# Patient Record
Sex: MALE | Race: WHITE | NOT HISPANIC OR LATINO | URBAN - METROPOLITAN AREA
[De-identification: names, ages, dates, MRNs, and addresses within clinical notes are randomized per-mention and may not be internally consistent; named-entity substitution may affect disease eponyms.]

---

## 2022-06-24 ENCOUNTER — INPATIENT (INPATIENT)
Facility: HOSPITAL | Age: 57
LOS: 0 days | Discharge: HOME | DRG: 63 | End: 2022-06-25
Attending: PSYCHIATRY & NEUROLOGY | Admitting: PSYCHIATRY & NEUROLOGY
Payer: COMMERCIAL

## 2022-06-24 VITALS
DIASTOLIC BLOOD PRESSURE: 81 MMHG | SYSTOLIC BLOOD PRESSURE: 127 MMHG | OXYGEN SATURATION: 97 % | HEART RATE: 60 BPM | RESPIRATION RATE: 18 BRPM

## 2022-06-24 LAB
ALBUMIN SERPL ELPH-MCNC: 4.3 G/DL — SIGNIFICANT CHANGE UP (ref 3.4–5)
ALP SERPL-CCNC: 81 U/L — SIGNIFICANT CHANGE UP (ref 40–120)
ALT FLD-CCNC: 31 U/L — SIGNIFICANT CHANGE UP (ref 12–42)
ANION GAP SERPL CALC-SCNC: 9 MMOL/L — SIGNIFICANT CHANGE UP (ref 9–16)
APTT BLD: 30 SEC — SIGNIFICANT CHANGE UP (ref 27.5–35.5)
AST SERPL-CCNC: 25 U/L — SIGNIFICANT CHANGE UP (ref 15–37)
BASOPHILS # BLD AUTO: 0.05 K/UL — SIGNIFICANT CHANGE UP (ref 0–0.2)
BASOPHILS NFR BLD AUTO: 0.4 % — SIGNIFICANT CHANGE UP (ref 0–2)
BILIRUB SERPL-MCNC: 0.5 MG/DL — SIGNIFICANT CHANGE UP (ref 0.2–1.2)
BUN SERPL-MCNC: 20 MG/DL — SIGNIFICANT CHANGE UP (ref 7–23)
CALCIUM SERPL-MCNC: 9 MG/DL — SIGNIFICANT CHANGE UP (ref 8.5–10.5)
CHLORIDE SERPL-SCNC: 102 MMOL/L — SIGNIFICANT CHANGE UP (ref 96–108)
CO2 SERPL-SCNC: 26 MMOL/L — SIGNIFICANT CHANGE UP (ref 22–31)
CREAT SERPL-MCNC: 0.98 MG/DL — SIGNIFICANT CHANGE UP (ref 0.5–1.3)
EGFR: 89 ML/MIN/1.73M2 — SIGNIFICANT CHANGE UP
EOSINOPHIL # BLD AUTO: 0.03 K/UL — SIGNIFICANT CHANGE UP (ref 0–0.5)
EOSINOPHIL NFR BLD AUTO: 0.2 % — SIGNIFICANT CHANGE UP (ref 0–6)
GLUCOSE SERPL-MCNC: 149 MG/DL — HIGH (ref 70–99)
HCT VFR BLD CALC: 41.7 % — SIGNIFICANT CHANGE UP (ref 39–50)
HGB BLD-MCNC: 14.3 G/DL — SIGNIFICANT CHANGE UP (ref 13–17)
IMM GRANULOCYTES NFR BLD AUTO: 0.6 % — SIGNIFICANT CHANGE UP (ref 0–1.5)
INR BLD: 1.01 — SIGNIFICANT CHANGE UP (ref 0.88–1.16)
LYMPHOCYTES # BLD AUTO: 0.9 K/UL — LOW (ref 1–3.3)
LYMPHOCYTES # BLD AUTO: 7.2 % — LOW (ref 13–44)
MAGNESIUM SERPL-MCNC: 2.1 MG/DL — SIGNIFICANT CHANGE UP (ref 1.6–2.6)
MCHC RBC-ENTMCNC: 31 PG — SIGNIFICANT CHANGE UP (ref 27–34)
MCHC RBC-ENTMCNC: 34.3 GM/DL — SIGNIFICANT CHANGE UP (ref 32–36)
MCV RBC AUTO: 90.5 FL — SIGNIFICANT CHANGE UP (ref 80–100)
MONOCYTES # BLD AUTO: 0.45 K/UL — SIGNIFICANT CHANGE UP (ref 0–0.9)
MONOCYTES NFR BLD AUTO: 3.6 % — SIGNIFICANT CHANGE UP (ref 2–14)
NEUTROPHILS # BLD AUTO: 11.07 K/UL — HIGH (ref 1.8–7.4)
NEUTROPHILS NFR BLD AUTO: 88 % — HIGH (ref 43–77)
NRBC # BLD: 0 /100 WBCS — SIGNIFICANT CHANGE UP (ref 0–0)
NT-PROBNP SERPL-SCNC: 22 PG/ML — SIGNIFICANT CHANGE UP
PLATELET # BLD AUTO: 247 K/UL — SIGNIFICANT CHANGE UP (ref 150–400)
POTASSIUM SERPL-MCNC: 3.9 MMOL/L — SIGNIFICANT CHANGE UP (ref 3.5–5.3)
POTASSIUM SERPL-SCNC: 3.9 MMOL/L — SIGNIFICANT CHANGE UP (ref 3.5–5.3)
PROT SERPL-MCNC: 7.6 G/DL — SIGNIFICANT CHANGE UP (ref 6.4–8.2)
PROTHROM AB SERPL-ACNC: 11.8 SEC — SIGNIFICANT CHANGE UP (ref 10.5–13.4)
RBC # BLD: 4.61 M/UL — SIGNIFICANT CHANGE UP (ref 4.2–5.8)
RBC # FLD: 13.2 % — SIGNIFICANT CHANGE UP (ref 10.3–14.5)
SARS-COV-2 RNA SPEC QL NAA+PROBE: SIGNIFICANT CHANGE UP
SODIUM SERPL-SCNC: 137 MMOL/L — SIGNIFICANT CHANGE UP (ref 132–145)
TROPONIN I, HIGH SENSITIVITY RESULT: <4 NG/L — SIGNIFICANT CHANGE UP
WBC # BLD: 12.57 K/UL — HIGH (ref 3.8–10.5)
WBC # FLD AUTO: 12.57 K/UL — HIGH (ref 3.8–10.5)

## 2022-06-24 PROCEDURE — 0042T: CPT

## 2022-06-24 PROCEDURE — 70496 CT ANGIOGRAPHY HEAD: CPT | Mod: 26

## 2022-06-24 PROCEDURE — 70498 CT ANGIOGRAPHY NECK: CPT | Mod: 26

## 2022-06-24 PROCEDURE — 99291 CRITICAL CARE FIRST HOUR: CPT

## 2022-06-24 RX ORDER — ALTEPLASE 100 MG
8.3 KIT INTRAVENOUS ONCE
Refills: 0 | Status: COMPLETED | OUTPATIENT
Start: 2022-06-24 | End: 2022-06-24

## 2022-06-24 RX ORDER — MECLIZINE HCL 12.5 MG
25 TABLET ORAL ONCE
Refills: 0 | Status: COMPLETED | OUTPATIENT
Start: 2022-06-24 | End: 2022-06-24

## 2022-06-24 RX ORDER — SODIUM CHLORIDE 9 MG/ML
50 INJECTION INTRAMUSCULAR; INTRAVENOUS; SUBCUTANEOUS
Refills: 0 | Status: DISCONTINUED | OUTPATIENT
Start: 2022-06-24 | End: 2022-06-25

## 2022-06-24 RX ORDER — ATORVASTATIN CALCIUM 80 MG/1
80 TABLET, FILM COATED ORAL AT BEDTIME
Refills: 0 | Status: DISCONTINUED | OUTPATIENT
Start: 2022-06-25 | End: 2022-06-25

## 2022-06-24 RX ORDER — ONDANSETRON 8 MG/1
4 TABLET, FILM COATED ORAL ONCE
Refills: 0 | Status: COMPLETED | OUTPATIENT
Start: 2022-06-24 | End: 2022-06-24

## 2022-06-24 RX ORDER — SODIUM CHLORIDE 9 MG/ML
50 INJECTION INTRAMUSCULAR; INTRAVENOUS; SUBCUTANEOUS
Refills: 0 | Status: DISCONTINUED | OUTPATIENT
Start: 2022-06-24 | End: 2022-06-24

## 2022-06-24 RX ORDER — ALTEPLASE 100 MG
74.5 KIT INTRAVENOUS ONCE
Refills: 0 | Status: COMPLETED | OUTPATIENT
Start: 2022-06-24 | End: 2022-06-24

## 2022-06-24 RX ADMIN — Medication 25 MILLIGRAM(S): at 14:30

## 2022-06-24 RX ADMIN — ALTEPLASE 74.5 MILLIGRAM(S): KIT at 15:00

## 2022-06-24 RX ADMIN — SODIUM CHLORIDE 74.5 MILLILITER(S): 9 INJECTION INTRAMUSCULAR; INTRAVENOUS; SUBCUTANEOUS at 16:00

## 2022-06-24 RX ADMIN — ALTEPLASE 498 MILLIGRAM(S): KIT at 14:53

## 2022-06-24 RX ADMIN — ALTEPLASE 8.3 MILLIGRAM(S): KIT at 14:54

## 2022-06-24 RX ADMIN — ONDANSETRON 4 MILLIGRAM(S): 8 TABLET, FILM COATED ORAL at 14:35

## 2022-06-24 RX ADMIN — ONDANSETRON 4 MILLIGRAM(S): 8 TABLET, FILM COATED ORAL at 14:05

## 2022-06-24 NOTE — ED ADULT TRIAGE NOTE - CHIEF COMPLAINT QUOTE
pt walk in c/o dizziness , diaphoresis and nausea x 2 hours after traveling from [place]. also c/o feeling off balance .denies med hx. unable to get oral temp

## 2022-06-24 NOTE — ED ADULT NURSE NOTE - CHIEF COMPLAINT QUOTE
pt walk in c/o dizziness , diaphoresis and nausea x 2 hours after traveling from [place]. also c/o feeling off balance .denies med hx. unable to get oral temp

## 2022-06-24 NOTE — ED PROVIDER NOTE - PROGRESS NOTE DETAILS
Stroke code initiated through CTC. Awaiting neuro consult. Escalated through CTC. Attempted to contact Dr. Dick. Contacted Dr. Dick and given story. Advised TPA. Will contact radiology to obtain CTA reports. Will TPA to post CTA results to exclude dissection. Patient transported. Already given TPA. No change in clinical status. CTA with perfusion mismatch. Patient admitted to NICU under neurology service. Contacted Dr. Dick and given story. Advised TPA via phone consult; telestroke not initiated. Will contact radiology to obtain CTA reports. Will TPA to post CTA results to exclude dissection.

## 2022-06-24 NOTE — ED PROVIDER NOTE - UNABLE TO OBTAIN
Urgent need for Intervention History and ROS limited 2/2 inability to cooperate with remainder of history due to clinical condition.

## 2022-06-24 NOTE — H&P ADULT - NSHPLABSRESULTS_GEN_ALL_CORE
LABS:                         14.3   12.57 )-----------( 247      ( 24 Jun 2022 14:04 )             41.7     06-24    137  |  102  |  20  ----------------------------<  149<H>  3.9   |  26  |  0.98    Ca    9.0      24 Jun 2022 14:04  Mg     2.1     06-24    TPro  7.6  /  Alb  4.3  /  TBili  0.5  /  DBili  x   /  AST  25  /  ALT  31  /  AlkPhos  81  06-24    PT/INR - ( 24 Jun 2022 14:04 )   PT: 11.8 sec;   INR: 1.01          PTT - ( 24 Jun 2022 14:04 )  PTT:30.0 sec        Serum Pro-Brain Natriuretic Peptide: 22 pg/mL (06-24 @ 14:05)        RADIOLOGY, EKG & ADDITIONAL TESTS:

## 2022-06-24 NOTE — H&P ADULT - NSHPREVIEWOFSYSTEMS_GEN_ALL_CORE
ROS:  Constitutional: No fever, weight loss or fatigue  Eyes: No eye pain, visual disturbances, or discharge  ENMT:  No difficulty hearing, tinnitus, vertigo;  No sinus or throat pain  Neck: No pain or stiffness  Respiratory: No cough, wheezing, chills or hemoptysis  Cardiovascular: No chest pain, palpitations, shortness of breath, dizziness or leg swelling  Gastrointestinal: No abdominal pain. No nausea, vomiting or hematemesis; No diarrhea or constipation. Nohematochezia.  Genitourinary: No dysuria, frequency, hematuria or incontinence  Neurological: As per HPI  Endocrine: No heat or cold intolerance; No hair loss  Musculoskeletal: No joint pain or swelling; No muscle, back or extremity pain

## 2022-06-24 NOTE — H&P ADULT - NSHPPHYSICALEXAM_GEN_ALL_CORE
General: No acute distress, awake and alert  Cardiovascular: Regular rate and rhythm on the monitor.  Pulmonary: Anterior breath sounds clear bilaterally, No use of accessory muscles.  Extremities: Radial and DP pulses +2, no edema  Neurologic:  -Mental status: Awake, alert, oriented to person, place, and time. Speech is fluent with intact naming, repetition, and comprehension, no dysarthria. Recent and remote memory intact. Follows commands. Attention/concentration intact. Fund of knowledge appropriate.  -Cranial nerves:   II: Visual fields are full to confrontation.  III, IV, VI: Extraocular movements are intact with R end beating nystagmus when looking to extreme Right. Pupils equally round and reactive to light  V:  Facial sensation V1-V3 equal and intact   VII: Face is symmetric with normal eye closure and smile  VIII: Hearing is bilaterally intact to finger rub  IX, X: Uvula is midline and soft palate rises symmetrically  XI: Head turning and shoulder shrug are intact.  XII: Tongue protrudes midline  Motor: Normal bulk and tone. No pronator drift. Strength bilateral upper extremity 5/5, bilateral lower extremities 5/5.  Rapid alternating movements intact and symmetric  Sensation: Intact to light touch bilaterally. No neglect or extinction on double simultaneous testing.  Coordination: No dysmetria on finger-to-nose and heel-to-shin bilaterally  Reflexes: Downgoing toes bilaterally   Gait: Deferred    NIHSS: 0

## 2022-06-24 NOTE — CONSULT NOTE ADULT - ASSESSMENT
Assessment and Plan :    55YO R handed male with no PMH, not on any home meds, presented to [facility] ER for intense and persistent dizziness which he describes as "room spinning feeling" that started when he got out of the airport and on his way to the hotel in the cab associated with N/V/Sweating and noted to be unsteady when getting out of the cab. Denies any HA/CP/Palp/vision or hearing Changes. Initial imaging negative for any acute intracranial pathology, now S/P tPA, transferred to [facility] MICU for further close neuro monitoring and work up.      1) Admit to MICU for post alteplase monitoring  - once alteplase drip completed, please flush IV line with 50cc NS 0.9%  - Please perform dysphagia screen now   - Once dysphagia screen passed, start atorvastatin 80 once daily  - Notify provider if patient passes dysphagia screen able to have diet if no pending intervention  - Keep BP <180/105, notify provider if out of range  - During infusion, neuro exams every 15 minutes.   - Check every 15 minutes for first hour after infusion is stopped; every 30 minutes for the next 6 hours; hourly until 24 hours from end of infusion.   - Minimize arterial and venous punctures, able to draw blood 4hr s/p alteplase  - Keep temp <100F and maintain glucose 140-180.   - Notify provider for "HR >100 or <55 or SaO2 <95%"  - Maintain strict bed rest for 12 hours with HOB <30 degrees  - After 12hr s/p alteplase, patient able to ambulate with assist  - Repeat CT head with any acute change in mental status.   - No antiplatelet, anticoagulation, and heparin sq until 24 hour CT head negative for bleed.     2) Labs: Obtain Hemoglobin A1c, Lipid profile set, and TSH    3) Other tests:  - Repeat CT head noncon 24 hours post-alteplase to rule out bleed  - Obtain MRI brain without contrast   - echo with bubble, may eventually need JEANNE  - PT/OT order  - Swallow evaluation if fails dysphagia screen  - SLP order if patient with dysarthria  - Stroke education    4)DVT ppx - SCDs (NO heparin SQ as post alteplase protocol)    Discussed with Neurology Attending Dr. Dick

## 2022-06-24 NOTE — PATIENT PROFILE ADULT - DO YOU LACK THE NECESSARY SUPPORT TO HELP YOU COPE WITH LIFE CHALLENGES?
Request for: Focalin XR 25 mg cap, take 1 cap daily  Last prescribed: 3/7/18   #30   Refills: 0    Next: 6/15/18  Dr. Yahr Nelson  No show(s)/pt cancel: 0  Last:  12/5/17    Verified dose against providers last note.    Per PDMP last filled 3/7/18 #30  PDMP review: Criteria met. Forwarded to Physician/DECLAN for approval/signature. Requests 2 months of scripts to cover until their appt with new provider.           no

## 2022-06-24 NOTE — H&P ADULT - ASSESSMENT
55YO R handed male with no PMH, not on any home meds, presented to [facility] ER for intense and persistent dizziness which he describes as "room spinning feeling" that started when he got out of the airport and on his way to the hotel in the cab associated with N/V/Sweating and noted to be unsteady when getting out of the cab. Denies any HA/CP/Palp/vision or hearing Changes. Initial imaging negative for any acute intracranial pathology, now S/P tPA, transferred to [facility] MICU for further close neuro monitoring and work up.    1) Admit to MICU for post alteplase monitoring  - once alteplase drip completed, please flush IV line with 50cc NS 0.9%  - Please perform dysphagia screen now   - Once dysphagia screen passed, start atorvastatin 80 once daily  - Notify provider if patient passes dysphagia screen able to have diet if no pending intervention  - Keep BP <180/105, notify provider if out of range  - During infusion, neuro exams every 15 minutes.   - Check every 15 minutes for first hour after infusion is stopped; every 30 minutes for the next 6 hours; hourly until 24 hours from end of infusion.   - Minimize arterial and venous punctures, able to draw blood 4hr s/p alteplase  - Keep temp <100F and maintain glucose 140-180.   - Notify provider for "HR >100 or <55 or SaO2 <95%"  - Maintain strict bed rest for 12 hours with HOB <30 degrees  - After 12hr s/p alteplase, patient able to ambulate with assist  - Repeat CT head with any acute change in mental status.   - No antiplatelet, anticoagulation, and heparin sq until 24 hour CT head negative for bleed.     2) Labs: Obtain Hemoglobin A1c, Lipid profile set, and TSH    3) Other tests:  - Repeat CT head noncon 24 hours post-alteplase to rule out bleed  - Obtain MRI brain without contrast   - echo with bubble, may eventually need JEANNE  - PT/OT order  - Swallow evaluation if fails dysphagia screen  - SLP order if patient with dysarthria  - Stroke education    4)DVT ppx - SCDs (NO heparin SQ as post alteplase protocol)    Discussed with Neurology Attending Dr. Dick

## 2022-06-24 NOTE — ED PROVIDER NOTE - CRITICAL CARE ATTENDING CONTRIBUTION TO CARE
The patient was seen immediately upon arrival due to a high probability of imminent or life-threatening deterioration secondary to possible acute stroke, which required my direct attention, intervention, and personal management at the bedside. I have personally provided critical care time exclusive of time spent on separately billable procedures. Time includes review of laboratory data, radiology results, discussion with consultants, discussion with the patient's family, and monitoring for potential decompensation.    56 M presenting with acute onset vertigo at 11:45 am. Sx most consistent with peripheral but are severe. Stroke code initiated. VSS + vertigo, + horizontal rotary nystagmus with R gaze. FTN/Heel shin wnl. CT head wnl. CTA neg. Perfusion study showed some mismatch. No improvement with Meclizine abd Epley maneuver. TPA administered incase it was a CVA. Admitted to ICU at [facility].

## 2022-06-24 NOTE — ED ADULT NURSE NOTE - OBJECTIVE STATEMENT
BIBwife through walk in triage c/o sudden onset of dizziness described as unsteady, nausea and vomiting with diaphoresis at 11:45am. wife became concerned, pt never had this before, took a taxi to the nearest ER since they are visiting from [place]. STROKE code activated 2/2 clinical upgrade. NIHS: 2 due to ataxia (unable to ambulate 2/2 dizziness). pt placed on telemetry, FS completed. will continue to monitor. denies CP, SOB, HA. no PMHX. SEE STROKE CODE WORKFLOW FOR FURTHER DETAILS

## 2022-06-24 NOTE — ED ADULT NURSE REASSESSMENT NOTE - NS ED NURSE REASSESS COMMENT FT1
given 4mg zofran IVP due to N/V, verbal order JESSENIA Wang
pt left on EMS stretcher with TPA infusion. pt dose verified with pharmacy and paramedic. reports N/V is better s/p antiemetics. awaiting nurse hand off
given 25mg meclizine PO and 4mg zofran IVP. +passed dysphagia screen

## 2022-06-24 NOTE — ED PROVIDER NOTE - OBJECTIVE STATEMENT
55 y/o male with no pertinent PMHx who was travelling to [facility] from [place] today. Patient got off the plane and into a cab and was on his way to his hotel when he developed intense and intractable dizziness. Patient also had difficulty standing when arriving to the hotel and needed help walking into the hotel. Could not bear weight. Symptoms started at 11:45am. Patient's wife decided to bring him to the ED when the Patient stated the dizziness would not resolve. Patient required help going downstairs to the hotel lobby and into the vehicle to come to the ED. Patient arrived to the ED extremely nauseous and vomiting; eyes closed. Unable to stand. Stroke code initiated.

## 2022-06-24 NOTE — H&P ADULT - HISTORY OF PRESENT ILLNESS
Chief Complaint: Dizzy, unsteady gait   Last known well time/Time of onset of symptoms: 06/24 @ 11:30am    HPI: 56YOM with no PMH, not on any home meds was travelling from [place] to [place] today. Per pt, started feeling dizzy on his way to hot from airport, while in the cab, associated with N/V/ unsteady gait and sweating. Pt noted to be very unsteady when attempting to get out of the cab and needed assistance. Denies any fall. Denies any HA/CP/SOB/palpitations/vision or hearing changes. States has never experienced this before. Describes dizziness as "room spinning feeling" which is continuous, though less intense now, worse with position change and noted to be resting with his eyes closed to help with dizziness. Pt presented to [facility] ER , stroke code was activated with intial CTH negative for any acute intracranial pathology, CTA H/N with no LVO/ stenosis and CTP with possible prolonged Tmax in R anterior temporal lobe and R cerebellar hemisphere which does not follow expected vascular territory. Pt received tPA @ [facility] ER and was transferred to [facility] MICU for close Neuro monitoring and further work up.

## 2022-06-25 VITALS — HEART RATE: 75 BPM | RESPIRATION RATE: 34 BRPM | OXYGEN SATURATION: 97 %

## 2022-06-25 LAB
A1C WITH ESTIMATED AVERAGE GLUCOSE RESULT: 5.3 % — SIGNIFICANT CHANGE UP (ref 4–5.6)
ANION GAP SERPL CALC-SCNC: 9 MMOL/L — SIGNIFICANT CHANGE UP (ref 5–17)
BUN SERPL-MCNC: 13 MG/DL — SIGNIFICANT CHANGE UP (ref 7–23)
CALCIUM SERPL-MCNC: 8.8 MG/DL — SIGNIFICANT CHANGE UP (ref 8.4–10.5)
CHLORIDE SERPL-SCNC: 104 MMOL/L — SIGNIFICANT CHANGE UP (ref 96–108)
CHOLEST SERPL-MCNC: 217 MG/DL — HIGH
CO2 SERPL-SCNC: 26 MMOL/L — SIGNIFICANT CHANGE UP (ref 22–31)
CREAT SERPL-MCNC: 0.94 MG/DL — SIGNIFICANT CHANGE UP (ref 0.5–1.3)
EGFR: 94 ML/MIN/1.73M2 — SIGNIFICANT CHANGE UP
ESTIMATED AVERAGE GLUCOSE: 105 MG/DL — SIGNIFICANT CHANGE UP (ref 68–114)
GLUCOSE SERPL-MCNC: 113 MG/DL — HIGH (ref 70–99)
HCT VFR BLD CALC: 39.2 % — SIGNIFICANT CHANGE UP (ref 39–50)
HCV AB S/CO SERPL IA: 0.04 S/CO — SIGNIFICANT CHANGE UP
HCV AB SERPL-IMP: SIGNIFICANT CHANGE UP
HDLC SERPL-MCNC: 57 MG/DL — SIGNIFICANT CHANGE UP
HGB BLD-MCNC: 13.6 G/DL — SIGNIFICANT CHANGE UP (ref 13–17)
LIPID PNL WITH DIRECT LDL SERPL: 140 MG/DL — HIGH
MAGNESIUM SERPL-MCNC: 2 MG/DL — SIGNIFICANT CHANGE UP (ref 1.6–2.6)
MCHC RBC-ENTMCNC: 30.6 PG — SIGNIFICANT CHANGE UP (ref 27–34)
MCHC RBC-ENTMCNC: 34.7 GM/DL — SIGNIFICANT CHANGE UP (ref 32–36)
MCV RBC AUTO: 88.1 FL — SIGNIFICANT CHANGE UP (ref 80–100)
NON HDL CHOLESTEROL: 160 MG/DL — HIGH
NRBC # BLD: 0 /100 WBCS — SIGNIFICANT CHANGE UP (ref 0–0)
PHOSPHATE SERPL-MCNC: 3 MG/DL — SIGNIFICANT CHANGE UP (ref 2.5–4.5)
PLATELET # BLD AUTO: 235 K/UL — SIGNIFICANT CHANGE UP (ref 150–400)
POTASSIUM SERPL-MCNC: 3.9 MMOL/L — SIGNIFICANT CHANGE UP (ref 3.5–5.3)
POTASSIUM SERPL-SCNC: 3.9 MMOL/L — SIGNIFICANT CHANGE UP (ref 3.5–5.3)
RBC # BLD: 4.45 M/UL — SIGNIFICANT CHANGE UP (ref 4.2–5.8)
RBC # FLD: 13.2 % — SIGNIFICANT CHANGE UP (ref 10.3–14.5)
SODIUM SERPL-SCNC: 139 MMOL/L — SIGNIFICANT CHANGE UP (ref 135–145)
TRIGL SERPL-MCNC: 100 MG/DL — SIGNIFICANT CHANGE UP
TSH SERPL-MCNC: 0.46 UIU/ML — SIGNIFICANT CHANGE UP (ref 0.27–4.2)
WBC # BLD: 8.12 K/UL — SIGNIFICANT CHANGE UP (ref 3.8–10.5)
WBC # FLD AUTO: 8.12 K/UL — SIGNIFICANT CHANGE UP (ref 3.8–10.5)

## 2022-06-25 PROCEDURE — 83036 HEMOGLOBIN GLYCOSYLATED A1C: CPT

## 2022-06-25 PROCEDURE — 93306 TTE W/DOPPLER COMPLETE: CPT

## 2022-06-25 PROCEDURE — 84484 ASSAY OF TROPONIN QUANT: CPT

## 2022-06-25 PROCEDURE — 84100 ASSAY OF PHOSPHORUS: CPT

## 2022-06-25 PROCEDURE — 85025 COMPLETE CBC W/AUTO DIFF WBC: CPT

## 2022-06-25 PROCEDURE — 83880 ASSAY OF NATRIURETIC PEPTIDE: CPT

## 2022-06-25 PROCEDURE — 80061 LIPID PANEL: CPT

## 2022-06-25 PROCEDURE — 80053 COMPREHEN METABOLIC PANEL: CPT

## 2022-06-25 PROCEDURE — 0042T: CPT

## 2022-06-25 PROCEDURE — 99291 CRITICAL CARE FIRST HOUR: CPT | Mod: 25

## 2022-06-25 PROCEDURE — 70551 MRI BRAIN STEM W/O DYE: CPT

## 2022-06-25 PROCEDURE — 70498 CT ANGIOGRAPHY NECK: CPT

## 2022-06-25 PROCEDURE — 84443 ASSAY THYROID STIM HORMONE: CPT

## 2022-06-25 PROCEDURE — 93306 TTE W/DOPPLER COMPLETE: CPT | Mod: 26

## 2022-06-25 PROCEDURE — 70450 CT HEAD/BRAIN W/O DYE: CPT

## 2022-06-25 PROCEDURE — 70496 CT ANGIOGRAPHY HEAD: CPT

## 2022-06-25 PROCEDURE — 82962 GLUCOSE BLOOD TEST: CPT

## 2022-06-25 PROCEDURE — 37195 THROMBOLYTIC THERAPY STROKE: CPT

## 2022-06-25 PROCEDURE — 85027 COMPLETE CBC AUTOMATED: CPT

## 2022-06-25 PROCEDURE — 85610 PROTHROMBIN TIME: CPT

## 2022-06-25 PROCEDURE — 80048 BASIC METABOLIC PNL TOTAL CA: CPT

## 2022-06-25 PROCEDURE — 96374 THER/PROPH/DIAG INJ IV PUSH: CPT | Mod: XU

## 2022-06-25 PROCEDURE — 85730 THROMBOPLASTIN TIME PARTIAL: CPT

## 2022-06-25 PROCEDURE — 83735 ASSAY OF MAGNESIUM: CPT

## 2022-06-25 PROCEDURE — 36415 COLL VENOUS BLD VENIPUNCTURE: CPT

## 2022-06-25 PROCEDURE — 86803 HEPATITIS C AB TEST: CPT

## 2022-06-25 PROCEDURE — 70551 MRI BRAIN STEM W/O DYE: CPT | Mod: 26

## 2022-06-25 PROCEDURE — 87635 SARS-COV-2 COVID-19 AMP PRB: CPT

## 2022-06-25 RX ORDER — ATORVASTATIN CALCIUM 80 MG/1
1 TABLET, FILM COATED ORAL
Qty: 30 | Refills: 0
Start: 2022-06-25 | End: 2022-07-24

## 2022-06-25 RX ORDER — ASPIRIN/CALCIUM CARB/MAGNESIUM 324 MG
1 TABLET ORAL
Qty: 30 | Refills: 0
Start: 2022-06-25 | End: 2022-07-24

## 2022-06-25 RX ORDER — CLOPIDOGREL BISULFATE 75 MG/1
1 TABLET, FILM COATED ORAL
Qty: 30 | Refills: 0
Start: 2022-06-25 | End: 2022-07-24

## 2022-06-25 RX ORDER — POTASSIUM CHLORIDE 20 MEQ
20 PACKET (EA) ORAL ONCE
Refills: 0 | Status: COMPLETED | OUTPATIENT
Start: 2022-06-25 | End: 2022-06-25

## 2022-06-25 RX ADMIN — Medication 20 MILLIEQUIVALENT(S): at 14:15

## 2022-06-25 NOTE — DISCHARGE NOTE NURSING/CASE MANAGEMENT/SOCIAL WORK - NSDCPEFALRISK_GEN_ALL_CORE
For information on Fall & Injury Prevention, visit: [email or web address] OR  [email or web address] OR  https://www.cdc.gov/steadi/patient.html

## 2022-06-25 NOTE — DISCHARGE NOTE PROVIDER - NSDCFUADDAPPT_GEN_ALL_CORE_FT
Please follow up with your primary care doctor and a stroke neurologist within the week of your discharge for continued work up.

## 2022-06-25 NOTE — PROGRESS NOTE ADULT - ASSESSMENT
Neuro  #CVA workup  - No antiplatelet, anticoagulation, and heparin sq until 24 hour CT head negative for bleed.   - continue atorvastatin 80mg daily once able to start PO and passes dysphagia screen.  - q1hr stroke neuro checks and vitals while in ICU.  - obtain MRI Brain without contrast  - Stroke Code HCT Results: Negative  - Stroke Code CTA Results: No LVO/stenosis.  - Obtain Rpt CTH (24hr post tPA)  - Obtain MRI (short stroke protocol)  - Stroke education    Cards  #HTN  - permissive hypertension, Goal -180  - Not on any home meds  - obtain TTE with bubble    #HLD  - high dose statin as above in CVA  - LDL results: 140    Pulm  - call provider if SPO2 < 94%    GI  #Nutrition/Fluids/Electrolytes   - replete K<4 and Mg <2  - Diet: Regular diet after dysphagia screen  - IVF: None for now    Renal  - C/T trend Bun/Crt 13/0.94    Infectious Disease  - C/T trend WBC 12.57--> 8.12    Endocrine  - A1C results: 5.3  - TSH results: 0.464    DVT Prophylaxis  - SCDs for DVT prophylaxis   - No antiplatelet, anticoagulation, and heparin sq until 24 hour CT head negative for bleed.       IDR Goals: Goals reviewed at interdisciplinary rounds with case management, social work, physical therapy, occupational therapy, and speech language pathology.   Please see specific therapy  notes for in depth goals.  Dispo: Pending PT/OT eval.  
Assessment and Plan :     57YO R handed male with no PMH, not on any home meds, was transferred from [facility] s/p tPA on 06/24 for close neuro monitoring. Pt p/w dizziness and unsteady gait associated with N/V which has mostly resolved now except for some dizziness upon changing position/moving his head. Initial imaging is negative for any acute intracranial pathology. Pending Rpt CTH today.        Neuro  #CVA workup  - No antiplatelet, anticoagulation, and heparin sq until 24 hour CT head negative for bleed.   - continue atorvastatin 80mg daily once able to start PO and passes dysphagia screen.  - q1hr stroke neuro checks and vitals while in ICU.  - obtain MRI Brain without contrast  - Stroke Code HCT Results: Negative  - Stroke Code CTA Results: No LVO/stenosis.  - Obtain Rpt CTH (24hr post tPA)  - Obtain MRI (short stroke protocol)  - Stroke education    Cards  #HTN  - permissive hypertension, Goal -180  - Not on any home meds  - obtain TTE with bubble    #HLD  - high dose statin as above in CVA  - LDL results: 140    Pulm  - call provider if SPO2 < 94%    GI  #Nutrition/Fluids/Electrolytes   - replete K<4 and Mg <2  - Diet: Regular diet after dysphagia screen  - IVF: None for now    Renal  - C/T trend Bun/Crt 13/0.94    Infectious Disease  - C/T trend WBC 12.57--> 8.12    Endocrine  - A1C results: 5.3  - TSH results: 0.464    DVT Prophylaxis  - SCDs for DVT prophylaxis   - No antiplatelet, anticoagulation, and heparin sq until 24 hour CT head negative for bleed.       IDR Goals: Goals reviewed at interdisciplinary rounds with case management, social work, physical therapy, occupational therapy, and speech language pathology.   Please see specific therapy  notes for in depth goals.  Dispo: Pending PT/OT eval.     Discussed daily hospital plans and goals with patient and family at bedside.(wife @ bedside).    Discussed with Neurology Attending Dr. Villarreal during rounds.

## 2022-06-25 NOTE — DISCHARGE NOTE PROVIDER - NSDCFUADDINST_GEN_ALL_CORE_FT
Please see a stroke neurologist for continued work up. Recommend repeat echo and additional blood vessel imaging to investigate possible CARDASIL/CADASIL due to MRI findings of abnormal blood vessel pattern suggestive of this process.

## 2022-06-25 NOTE — PROGRESS NOTE ADULT - SUBJECTIVE AND OBJECTIVE BOX
Neurology Stroke Progress Note    INTERVAL HPI/OVERNIGHT EVENTS:  Patient seen and examined @ bedside. No acute distress. Noted to be resting in the  bed with eyes open. States he feels better with dizziness, has improved but not resolved, still feels dizzy when changing position/ moving his head. Denies any HA/N/V/vision or hearing changes/ unilateral weakness. pending Rpt CTH today and MRI, TTE.    MEDICATIONS  (STANDING):  atorvastatin 80 milliGRAM(s) Oral at bedtime  sodium chloride 0.9%. 50 milliLiter(s) (74.5 mL/Hr) IV Continuous <Continuous>    MEDICATIONS  (PRN):      Allergies  No Known Allergies      Vital Signs Last 24 Hrs  T(C): 36.6 (25 Jun 2022 10:00), Max: 37.1 (24 Jun 2022 14:34)  T(F): 97.8 (25 Jun 2022 10:00), Max: 98.7 (24 Jun 2022 14:34)  HR: 65 (25 Jun 2022 13:00) (59 - 86)  BP: 111/68 (25 Jun 2022 13:00) (105/62 - 138/75)  BP(mean): 85 (25 Jun 2022 13:00) (80 - 98)  RR: 16 (25 Jun 2022 13:00) (13 - 22)  SpO2: 97% (25 Jun 2022 13:00) (95% - 100%)    Physical exam:  General: No acute distress, awake and alert  Eyes: Anicteric sclerae, moist conjunctivae, see below for CNs  Neck: trachea midline, FROM.  Cardiovascular: Regular rate and rhythm.  Pulmonary: Anterior breath sounds clear bilaterally, No use of accessory muscles.  GI: Abdomen soft, non-distended, non-tender  Extremities: Radial and DP pulses +2, no edema    Neurologic:  -Mental status: Awake, alert, oriented to person, place, and time. Speech is fluent with intact naming, repetition, and comprehension, no dysarthria. Recent and remote memory intact. Follows commands. Attention/concentration intact. Fund of knowledge appropriate.  -Cranial nerves:   II: Visual fields are full to confrontation.  III, IV, VI: Extraocular movements are intact with R end beating nystagmus. No double/ blurry vision. Pupils equally round and reactive to light  V:  Facial sensation V1-V3 equal and intact   VII: Face is symmetric with normal eye closure and smile  VIII: Hearing is bilaterally intact to finger rub  IX, X: Uvula is midline and soft palate rises symmetrically  XI: Head turning and shoulder shrug are intact.  XII: Tongue protrudes midline  Motor: Normal bulk and tone. No pronator drift. Strength bilateral upper extremity 5/5, bilateral lower extremities 5/5.  Rapid alternating movements intact and symmetric  Sensation: Intact to light touch bilaterally. No neglect or extinction on double simultaneous testing.  Coordination: No dysmetria on finger-to-nose and heel-to-shin bilaterally  Reflexes: Downgoing toes bilaterally   Gait: Deferred    LABS:                        13.6   8.12  )-----------( 235      ( 25 Jun 2022 05:02 )             39.2     06-25    139  |  104  |  13  ----------------------------<  113<H>  3.9   |  26  |  0.94    Ca    8.8      25 Jun 2022 05:02  Phos  3.0     06-25  Mg     2.0     06-25    TPro  7.6  /  Alb  4.3  /  TBili  0.5  /  DBili  x   /  AST  25  /  ALT  31  /  AlkPhos  81  06-24    PT/INR - ( 24 Jun 2022 14:04 )   PT: 11.8 sec;   INR: 1.01          PTT - ( 24 Jun 2022 14:04 )  PTT:30.0 sec      RADIOLOGY & ADDITIONAL TESTS: Reviewed    
Patient is a 56y old  Male who presents with a chief complaint of Stroke workup (25 Jun 2022 13:18)      INTERVAL HPI/OVERNIGHT EVENTS:   No overnight events   Pt seen and examined at beside this am  Afebrile, hemodynamically stable       ICU Vital Signs Last 24 Hrs  T(C): 36.8 (25 Jun 2022 19:05), Max: 36.8 (25 Jun 2022 06:06)  T(F): 98.2 (25 Jun 2022 19:05), Max: 98.3 (25 Jun 2022 14:00)  HR: 75 (25 Jun 2022 20:00) (59 - 82)  BP: 132/78 (25 Jun 2022 19:00) (107/69 - 138/75)  BP(mean): 101 (25 Jun 2022 19:00) (81 - 101)  ABP: --  ABP(mean): --  RR: 34 (25 Jun 2022 20:00) (12 - 34)  SpO2: 97% (25 Jun 2022 20:00) (96% - 98%)    I&O's Summary    24 Jun 2022 07:01  -  25 Jun 2022 07:00  --------------------------------------------------------  IN: 74.5 mL / OUT: 750 mL / NET: -675.5 mL          LABS:                        13.6   8.12  )-----------( 235      ( 25 Jun 2022 05:02 )             39.2     06-25    139  |  104  |  13  ----------------------------<  113<H>  3.9   |  26  |  0.94    Ca    8.8      25 Jun 2022 05:02  Phos  3.0     06-25  Mg     2.0     06-25    TPro  7.6  /  Alb  4.3  /  TBili  0.5  /  DBili  x   /  AST  25  /  ALT  31  /  AlkPhos  81  06-24    PT/INR - ( 24 Jun 2022 14:04 )   PT: 11.8 sec;   INR: 1.01          PTT - ( 24 Jun 2022 14:04 )  PTT:30.0 sec    CAPILLARY BLOOD GLUCOSE            RADIOLOGY & ADDITIONAL TESTS:    Consultant(s) Notes Reviewed:  [x ] YES  [ ] NO    MEDICATIONS  (STANDING):  atorvastatin 80 milliGRAM(s) Oral at bedtime    MEDICATIONS  (PRN):      PHYSICAL EXAM:  General: No acute distress, awake and alert  Eyes: Anicteric sclerae, moist conjunctivae, see below for CNs  Neck: trachea midline, FROM.  Cardiovascular: Regular rate and rhythm.  Pulmonary: Anterior breath sounds clear bilaterally, No use of accessory muscles.  GI: Abdomen soft, non-distended, non-tender  Extremities: Radial and DP pulses +2, no edema    Care Discussed with Consultants/Other Providers [ x] YES  [ ] NO

## 2022-06-25 NOTE — DISCHARGE NOTE PROVIDER - NSDCCPCAREPLAN_GEN_ALL_CORE_FT
PRINCIPAL DISCHARGE DIAGNOSIS  Diagnosis: Stroke  Assessment and Plan of Treatment: During this hospital admission, you had an ischemic stroke. During an ischemic stroke, blood stops flowing to part of your brain because of a blockage in the blood vessel. This can damage areas in the brain that control other parts of the body.  Please take your aspirin and plavix for blood thinning and Atorvastatin for cholesterol medication/blood vessel protection as prescribed to prevent further strokes. Do not skip doses and do not run low on your medication. If you run low on your medication, please contact your doctor.  You will follow up outpatient with the stroke Nurse Practitioner as scheduled below.  Doing your regular tasks may be difficult after you've had a stroke, but you can learn new ways to manage your daily activities. In fact, doing daily activities may help you to regain muscle strength. Be patient, give yourself time to adjust, and appreciate the progress you make. For example, when showering or bathing, test the water temperature with a hand or foot that was not affected by the stroke, use grab bars, a shower seat, a hand-held showerhead, etc. It is normal to feel fatigue after a stroke, while some days may be worse than others, you will continue to improve.  Call 911 right away if you have any of the following symptoms of another stroke:  B: Balance: Sudden: Dizziness, loss of balance, or a sense of falling, difficulty with coordinating movement  E: Eyes: Sudden double vision or trouble seeing in one or both eyes  F: Face: Sudden uneven face  A: Arms (Legs): Sudden weakness, tingling, or loss of feeling on one side of your face or body  S: Speech: Sudden trouble talking or slurred speech, sudden difficulty understanding others  T: Time: Please call 911 right away and go to the emergency room  •Sudden, severe headache  •Blackouts or seizures

## 2022-06-25 NOTE — DISCHARGE NOTE PROVIDER - NSDCQMPATIENTREHAB_NEU_A_CORE
PT/OT attempted to see patient, however was still on bedrest post tpa. Patient requesting discharge before PT/OT able to perform assessment./Patient is at baseline

## 2022-06-25 NOTE — DISCHARGE NOTE PROVIDER - NSDCMRMEDTOKEN_GEN_ALL_CORE_FT
Aspirin Enteric Coated 81 mg oral delayed release tablet: 1 tab(s) orally once a day   atorvastatin 80 mg oral tablet: 1 tab(s) orally once a day (at bedtime)  Plavix 75 mg oral tablet: 1 tab(s) orally once a day

## 2022-06-25 NOTE — DISCHARGE NOTE PROVIDER - HOSPITAL COURSE
56y right handed Male with no PMH, not on any meds, presented to [facility] ER for intense and persistent dizziness which he describes as "room spinning feeling" that started when he got out of the airport and on his way to the hotel in the cab associated with nausea, vomiting and diaphoresis and noted to be unsteady when getting out of the cab. CTH and CTA were negative of acute process. CTP did have a prolonged Tmax in the right anterior temporal lobe and right cerebellar lobe which do no correlate to a vascular distribution. However, due to severity of patient's symptoms and concern for a stroke, alteplase was given. Patient was transferred to [facility] for stroke management and post tpa monitoring. MRI was completed 24 hours after tpa with no evidence of acute ischemia, however, with patient's clinical presentation, patient did have a stroke that was aborted by the tpa. In addition, MRI also with scattered punctate foci in the anterior temporal subcortical white matter and within the insula bilaterally that are atypical for small vessel disease. Pattern can be seen in CADASIL. Echo with early bubbles suggesting PFO, but not well visualized. All imaging findings were discussed with patient and family at bedside. Recommend that he stay for further vessel imaging and testing to investigate possible CARDASIL/CADASIL process and for repeat echo. However, patient is visiting from [place] with children at home. Risk and benefits were discussed in staying for complete work up. Patient states he will follow up with a neurology team when he returns to [place] as well as his PCP. Stroke education provided. He is to start DAPT tomorrow and lipitor for secondary stroke prevention. Patient neurological exam with no focal deficits. Dizziness has resolved.     During this hospital course, patient no evidence of stroke on MRI due to aborted stroke.     Patient had the following workup done in house:  CT Head: No acute intracranial hemorrhage, mass effect or demarcated infarct.  MR Head Non Contrast: The MRI examination of the brain demonstrates the ventricles,   cisternal spaces, and cortical sulci to be appropriate for the patient's   stated age. There is no midline shift or extra-axial collection. The   FLAIR images demonstrate demonstrate scattered punctate foci of increased   signal within the periventricular and subcortical white matter. There are   a few foci within the anterior temporal subcortical white matter and   within the insula bilaterally just somewhat atypical for small vessel   ischemic disease. This distribution can be seen in CADASIL and clinical   correlation is recommended. The diffusion-weighted images demonstrate no   acute ischemia.  CT Angio Head: No evidence of vascular stenosis, occlusion,   aneurysm or vascular malformation.  CT Angio Neck: No vessel narrowing or occlusion in the neck.  Echo:   1. Normal left ventricular size and systolic function.   2. Normal right ventricular size and systolic function.   3. No significant valvular disease.   4. Injection of agitated saline is difficult to interpretdue to limited   imaging. There appear to be early bubbles appearing in the left heart in   under 3 cardiac cycles, but not well visualized. Recommend repeat bubble   study vs. transesophageal echocardiogram.   5. Trivial pericardial effusion.    Labs: A1c 5.3,       Physical exam at discharge:  Neurologic:  -Mental status: Awake, alert, oriented to person, place, and time. Speech is fluent with intact naming, repetition, and comprehension, no dysarthria. Recent and remote memory intact. Follows commands. Attention/concentration intact. Fund of knowledge appropriate.  -Cranial nerves:   II: Visual fields are full to confrontation.  III, IV, VI: Extraocular movements are intact with resolved R end beating nystagmus. No double/ blurry vision. Pupils equally round and reactive to light  V:  Facial sensation V1-V3 equal and intact   VII: Face is symmetric with normal eye closure and smile  VIII: Hearing is bilaterally intact to finger rub  IX, X: Uvula is midline and soft palate rises symmetrically  XI: Head turning and shoulder shrug are intact.  XII: Tongue protrudes midline  Motor: Normal bulk and tone. No pronator drift. Strength bilateral upper extremity 5/5, bilateral lower extremities 5/5.  Sensation: Intact to light touch bilaterally. No neglect or extinction on double simultaneous testing.  Coordination: No dysmetria on finger-to-nose and heel-to-shin bilaterally  Reflexes: Downgoing toes bilaterally   Gait: Narrow and steady    NIHSS at discharge: 0    New medications on discharge: aspirin 81mg daily, plavix 75mg daily, atorvastatin 80mg daily  Labs to be followed up: genetic testing for CADASIL (NOTCH3)  Imaging to be done as outpatient: TTE, MRI/MRA with and without contrast  Further outpatient workup: as above

## 2022-06-25 NOTE — DISCHARGE NOTE NURSING/CASE MANAGEMENT/SOCIAL WORK - PATIENT PORTAL LINK FT
You can access the FollowMyHealth Patient Portal offered by [facility] by registering at the following website: [email or web address]. By joining [facility]’s FollowMyHealth portal, you will also be able to view your health information using other applications (apps) compatible with our system.

## 2022-06-30 DIAGNOSIS — I63.9 CEREBRAL INFARCTION, UNSPECIFIED: ICD-10-CM

## 2022-06-30 DIAGNOSIS — E78.5 HYPERLIPIDEMIA, UNSPECIFIED: ICD-10-CM

## 2022-06-30 DIAGNOSIS — R29.702 NIHSS SCORE 2: ICD-10-CM

## 2022-06-30 DIAGNOSIS — I10 ESSENTIAL (PRIMARY) HYPERTENSION: ICD-10-CM

## 2022-06-30 DIAGNOSIS — Z20.822 CONTACT WITH AND (SUSPECTED) EXPOSURE TO COVID-19: ICD-10-CM
